# Patient Record
Sex: FEMALE | Race: WHITE | Employment: FULL TIME | URBAN - METROPOLITAN AREA
[De-identification: names, ages, dates, MRNs, and addresses within clinical notes are randomized per-mention and may not be internally consistent; named-entity substitution may affect disease eponyms.]

---

## 2019-08-19 ENCOUNTER — OFFICE VISIT (OUTPATIENT)
Dept: OBGYN CLINIC | Age: 54
End: 2019-08-19

## 2019-08-19 VITALS — BODY MASS INDEX: 37.38 KG/M2 | SYSTOLIC BLOOD PRESSURE: 163 MMHG | DIASTOLIC BLOOD PRESSURE: 89 MMHG | WEIGHT: 211 LBS

## 2019-08-19 DIAGNOSIS — Z01.419 WELL FEMALE EXAM WITH ROUTINE GYNECOLOGICAL EXAM: Primary | ICD-10-CM

## 2019-08-19 DIAGNOSIS — Z11.51 SPECIAL SCREENING EXAMINATION FOR HUMAN PAPILLOMAVIRUS (HPV): ICD-10-CM

## 2019-08-19 DIAGNOSIS — Z01.419 WELL WOMAN EXAM WITH ROUTINE GYNECOLOGICAL EXAM: ICD-10-CM

## 2019-08-19 RX ORDER — SIMVASTATIN 20 MG/1
TABLET, FILM COATED ORAL
COMMUNITY
Start: 2019-05-29

## 2019-08-19 RX ORDER — LISINOPRIL 10 MG/1
TABLET ORAL
COMMUNITY
Start: 2019-08-15

## 2019-08-19 NOTE — PROGRESS NOTES
Annual exam ages 40-58      Lance Lynne is a ,  49 y.o. female   Patient's last menstrual period was 2016 (exact date). She presents for her annual checkup. She is having hot flashes. With regard to the Gardasil vaccine, she is older than the FDA approved age to receive it. Menstrual status:    Her periods are nonexistent in flow. Contraception:    The current method of family planning is NA post menopause. Hormonal status:  She reports no perimenstrual type symptoms. She is not having vasomotor symptoms. The patient is not using any ERT. Sexual history:    She  reports that she does not engage in sexual activity. Medical conditions:    Since her last annual GYN exam about three years ago, she has not the following changes in her health history: none. Surgical history confirmed with patient. has a past surgical history that includes hx cholecystectomy; hx orthopaedic; endometrial cryoablation; hx tubal ligation; hx urological (13); and hx lap gastric bypass (10/2014). Pap and Mammogram History:    Her most recent Pap smear was normal, obtained 5 year(s) ago. The patient had her mammogram today in our office. Breast Cancer History/Substance Abuse: negative      Osteoporosis History:    Family history does not include a first or second degree relative with osteopenia or osteoporosis. A bone density scan has not been done.     Past Medical History:   Diagnosis Date    Edema of both legs     Hypercholesterolemia     Hypertension     Neuropathy     lower extremity-taking Neurontin    Obesity     Sleep apnea     Unspecified sleep apnea     compliant with c-pap    Vitamin D deficiency      Past Surgical History:   Procedure Laterality Date    ENDOMETRIAL CRYOABLATION      HX CHOLECYSTECTOMY      HX LAP GASTRIC BYPASS  10/2014    Sleeve    HX ORTHOPAEDIC      right foot surgery    HX TUBAL LIGATION      HX UROLOGICAL  13    TVT-O Current Outpatient Medications   Medication Sig Dispense Refill    lisinopril (PRINIVIL, ZESTRIL) 10 mg tablet       simvastatin (ZOCOR) 20 mg tablet       DEXTROAMPHETAMINE/AMPHETAMINE (ADDERALL PO) Take 5 mg by mouth two (2) times daily as needed.  CYANOCOBALAMIN, VITAMIN B-12, (LIQUID B 12 PO) Take  by mouth.  multivitamin (ONE A DAY) tablet Take 1 Tab by mouth daily.  calcium carbonate (MYLANTA) 400 mg chew Take 1 Tab by mouth three (3) times daily (with meals).  ergocalciferol (ERGOCALCIFEROL) 50,000 unit capsule        Allergies: Patient has no known allergies. Tobacco History:  reports that she has been smoking. She has been smoking about 1.00 pack per day. She has never used smokeless tobacco.  Alcohol Abuse:  reports that she drinks about 0.8 standard drinks of alcohol per week. Drug Abuse:  reports that she does not use drugs.     Family Medical/Cancer History:   Family History   Problem Relation Age of Onset    Heart Disease Mother     Hypertension Mother     Asthma Father     SLE Maternal Grandmother     Cancer Maternal Grandfather         unknown    Bipolar Disorder Brother         Review of Systems - History obtained from the patient  Constitutional: negative for weight loss, fever, night sweats  HEENT: negative for hearing loss, earache, congestion, snoring, sorethroat  CV: negative for chest pain, palpitations, edema  Resp: negative for cough, shortness of breath, wheezing  GI: negative for change in bowel habits, abdominal pain, black or bloody stools  : negative for frequency, dysuria, hematuria, vaginal discharge  MSK: negative for back pain, joint pain, muscle pain  Breast: negative for breast lumps, nipple discharge, galactorrhea  Skin :negative for itching, rash, hives  Neuro: negative for dizziness, headache, confusion, weakness  Psych: negative for anxiety, depression, change in mood  Heme/lymph: negative for bleeding, bruising, pallor    Physical Exam    Visit Vitals  /89   Wt 211 lb (95.7 kg)   LMP 02/18/2016 (Exact Date)   BMI 37.38 kg/m²       Constitutional  · Appearance: well-nourished, well developed, alert, in no acute distress    HENT  · Head and Face: appears normal    Neck  · Inspection/Palpation: normal appearance, no masses or tenderness  · Lymph Nodes: no lymphadenopathy present  · Thyroid: gland size normal, nontender, no nodules or masses present on palpation    Chest  · Respiratory Effort: breathing unlabored  · Auscultation:    Cardiovascular  · Heart:  · Auscultation:     Breasts  · Inspection of Breasts: breasts symmetrical, no skin changes, no discharge present, nipple appearance normal, no skin retraction present  · Palpation of Breasts and Axillae: no masses present on palpation, no breast tenderness  · Axillary Lymph Nodes: no lymphadenopathy present    Gastrointestinal  · Abdominal Examination: abdomen non-tender to palpation, normal bowel sounds, no masses present  · Liver and spleen: no hepatomegaly present, spleen not palpable  · Hernias: no hernias identified    Genitourinary  · External Genitalia: normal appearance for age, no discharge present, no tenderness present, no inflammatory lesions present, no masses present, no atrophy present  · Vagina: normal vaginal vault without central or paravaginal defects, no discharge present, no inflammatory lesions present, no masses present  · Bladder: non-tender to palpation  · Urethra: appears normal  · Cervix: normal   · Uterus: normal size, shape and consistency  · Adnexa: no adnexal tenderness present, no adnexal masses present  · Perineum: perineum within normal limits, no evidence of trauma, no rashes or skin lesions present  · Anus: anus within normal limits, no hemorrhoids present  · Inguinal Lymph Nodes: no lymphadenopathy present    Skin  · General Inspection: no rash, no lesions identified    Neurologic/Psychiatric  · Mental Status:  · Orientation: grossly oriented to person, place and time  · Mood and Affect: mood normal, affect appropriate    Assessment:  Routine gynecologic examination  Her current medical status is satisfactory with no evidence of significant gynecologic issues.     Plan:  Counseled re: diet, exercise, healthy lifestyle  Return for yearly wellness visits  Rec annual mammogram  Advised to f/u with PCP re BP

## 2019-08-22 LAB
CYTOLOGIST CVX/VAG CYTO: NORMAL
CYTOLOGY CVX/VAG DOC CYTO: NORMAL
CYTOLOGY CVX/VAG DOC THIN PREP: NORMAL
CYTOLOGY HISTORY:: NORMAL
DX ICD CODE: NORMAL
HPV I/H RISK 1 DNA CVX QL PROBE+SIG AMP: NEGATIVE
Lab: NORMAL
OTHER STN SPEC: NORMAL
STAT OF ADQ CVX/VAG CYTO-IMP: NORMAL

## 2021-07-20 ENCOUNTER — OFFICE VISIT (OUTPATIENT)
Dept: OBGYN CLINIC | Age: 56
End: 2021-07-20
Payer: COMMERCIAL

## 2021-07-20 VITALS
HEIGHT: 63 IN | SYSTOLIC BLOOD PRESSURE: 152 MMHG | DIASTOLIC BLOOD PRESSURE: 96 MMHG | WEIGHT: 233 LBS | BODY MASS INDEX: 41.29 KG/M2

## 2021-07-20 DIAGNOSIS — Z01.419 WELL WOMAN EXAM WITH ROUTINE GYNECOLOGICAL EXAM: Primary | ICD-10-CM

## 2021-07-20 PROCEDURE — 99396 PREV VISIT EST AGE 40-64: CPT | Performed by: OBSTETRICS & GYNECOLOGY

## 2021-07-20 RX ORDER — DEXTROAMPHETAMINE SACCHARATE, AMPHETAMINE ASPARTATE, DEXTROAMPHETAMINE SULFATE AND AMPHETAMINE SULFATE 2.5; 2.5; 2.5; 2.5 MG/1; MG/1; MG/1; MG/1
TABLET ORAL
COMMUNITY
Start: 2021-06-16 | End: 2021-07-20 | Stop reason: SDUPTHER

## 2021-07-20 NOTE — PROGRESS NOTES
Evelyn Duque is a ,  55 y.o. female WHITE/NON- whose LMP was on  who presents for her annual checkup. She is having no significant problems. Menstrual status:    She is not having periods because she is menopausal.    The patient is not using HRT. Contraception:    She does not need contraception because she is menopausal.    Sexual history:    She  reports previously being sexually active. She reports using the following method of birth control/protection: Surgical.    Medical conditions:    Since her last annual GYN exam about two years ago, she has had the following changes in her health history: none. Pap and Mammogram History:    Her most recent Pap smear was normal obtained 2 year(s) ago. The patient had her mammogram today in our office. Breast Cancer History/Substance Abuse:    She has no family history of breast cancer. Osteoporosis History:    Family history does not include a first or second degree relative with osteopenia or osteoporosis. A bone density scan has not been previously obtained. Past Medical History:   Diagnosis Date    Edema of both legs     Hypercholesterolemia     Hypertension     Neuropathy     lower extremity-taking Neurontin    Obesity     Sleep apnea     Unspecified sleep apnea     compliant with c-pap    Vitamin D deficiency      Past Surgical History:   Procedure Laterality Date    ENDOMETRIAL CRYOABLATION      HX CHOLECYSTECTOMY      HX LAP GASTRIC BYPASS  10/2014    Sleeve    HX ORTHOPAEDIC      right foot surgery    HX TUBAL LIGATION      HX UROLOGICAL  13    TVT-O     Current Outpatient Medications   Medication Sig Dispense Refill    lisinopril (PRINIVIL, ZESTRIL) 10 mg tablet       simvastatin (ZOCOR) 20 mg tablet       DEXTROAMPHETAMINE/AMPHETAMINE (ADDERALL PO) Take 5 mg by mouth two (2) times daily as needed. Allergies: Patient has no known allergies.    Social History     Socioeconomic History    Marital status:      Spouse name: Not on file    Number of children: Not on file    Years of education: Not on file    Highest education level: Not on file   Occupational History    Not on file   Tobacco Use    Smoking status: Current Every Day Smoker     Packs/day: 1.00    Smokeless tobacco: Never Used   Substance and Sexual Activity    Alcohol use: Yes     Alcohol/week: 0.8 standard drinks     Types: 1 Shots of liquor per week     Comment: occasionally    Drug use: No    Sexual activity: Not Currently     Birth control/protection: Surgical     Comment: tubal ligation   Other Topics Concern    Not on file   Social History Narrative    Not on file     Social Determinants of Health     Financial Resource Strain:     Difficulty of Paying Living Expenses:    Food Insecurity:     Worried About Running Out of Food in the Last Year:     Ran Out of Food in the Last Year:    Transportation Needs:     Lack of Transportation (Medical):  Lack of Transportation (Non-Medical):    Physical Activity:     Days of Exercise per Week:     Minutes of Exercise per Session:    Stress:     Feeling of Stress :    Social Connections:     Frequency of Communication with Friends and Family:     Frequency of Social Gatherings with Friends and Family:     Attends Zoroastrian Services:     Active Member of Clubs or Organizations:     Attends Club or Organization Meetings:     Marital Status:    Intimate Partner Violence:     Fear of Current or Ex-Partner:     Emotionally Abused:     Physically Abused:     Sexually Abused: Tobacco History:  reports that she has been smoking. She has been smoking about 1.00 pack per day. She has never used smokeless tobacco.  Alcohol Abuse:  reports current alcohol use of about 0.8 standard drinks of alcohol per week. Drug Abuse:  reports no history of drug use.   Patient Active Problem List   Diagnosis Code    HTN (hypertension) I10         Review of Systems - History obtained from the patient  Constitutional: negative for weight loss, fever, night sweats  HEENT: negative for hearing loss, earache, congestion, snoring, sorethroat  CV: negative for chest pain, palpitations, edema  Resp: negative for cough, shortness of breath, wheezing  GI: negative for change in bowel habits, abdominal pain, black or bloody stools  : negative for frequency, dysuria, hematuria, vaginal discharge  MSK: negative for back pain, joint pain, muscle pain  Breast: negative for breast lumps, nipple discharge, galactorrhea  Skin :negative for itching, rash, hives  Neuro: negative for dizziness, headache, confusion, weakness  Psych: negative for anxiety, depression, change in mood  Heme/lymph: negative for bleeding, bruising, pallor    Physical Exam    Visit Vitals  BP (!) 152/96   Ht 5' 3\" (1.6 m)   Wt 233 lb (105.7 kg)   LMP 02/18/2016 (Exact Date)   BMI 41.27 kg/m²     Constitutional  · Appearance: well-nourished, well developed, alert, in no acute distress    HENT  · Head and Face: appears normal    Neck  · Inspection/Palpation: normal appearance, no masses or tenderness  · Lymph Nodes: no lymphadenopathy present    Chest  · Respiratory Effort: breathing normal    Breasts  · Inspection of Breasts: breasts symmetrical, no skin changes, no discharge present, nipple appearance normal, no skin retraction present  · Palpation of Breasts and Axillae: no masses present on palpation, no breast tenderness  · Axillary Lymph Nodes: no lymphadenopathy present    Gastrointestinal  · Abdominal Examination: abdomen non-tender to palpation, normal bowel sounds, no masses present  · Liver and spleen: no hepatomegaly present, spleen not palpable  · Hernias: no hernias identified    Skin  · General Inspection: no rash, no lesions identified    Neurologic/Psychiatric  · Mental Status:  · Orientation: grossly oriented to person, place and time  · Mood and Affect: mood normal, affect appropriate    Genitourinary  · External Genitalia: normal appearance for age, no discharge present, no tenderness present, no inflammatory lesions present, no masses present, no atrophy present  · Vagina: normal vaginal vault without central or paravaginal defects, no discharge present, no inflammatory lesions present, no masses present  · Bladder: non-tender to palpation  · Urethra: appears normal  · Cervix: normal   · Uterus: normal size, shape and consistency  · Adnexa: no adnexal tenderness present, no adnexal masses present  · Perineum: perineum within normal limits, no evidence of trauma, no rashes or skin lesions present  · Anus: anus within normal limits, no hemorrhoids present  · Inguinal Lymph Nodes: no lymphadenopathy present    Assessment:  Routine gynecologic examination  Her current medical status is satisfactory with no evidence of significant gynecologic issues.     Plan:  Counseled re: diet, exercise, healthy lifestyle  Return for yearly wellness visits  Rec annual mammogram  Patient Verbalized understanding

## 2022-08-25 NOTE — PROGRESS NOTES
Surendra Rodriguez is a ,  65 y.o. female [address] [facility] whose LMP was on  who presents for her annual checkup. She is having no significant problems. Menstrual status:    She is not having periods because she is menopausal.    The patient is not using HRT. Contraception:    She does not need contraception because she is menopausal.    Sexual history:    She  reports that she is not currently sexually active. She reports using the following method of birth control/protection: Surgical.    Medical conditions:    Since her last annual GYN exam about one year ago, she has had the following changes in her health history: none. Pap and Mammogram History:    Her most recent Pap smear was normal obtained 3 year(s) ago on 19. The patient had a recent mammogram 22 which was negative for malignancy. Breast Cancer History/Substance Abuse:    She has a family history of breast cancer. Osteoporosis History:    Family history does not include a first or second degree relative with osteopenia or osteoporosis. A bone density scan has not been previously obtained.      Past Medical History:   Diagnosis Date    Edema of both legs     Hypercholesterolemia     Hypertension     Neuropathy     lower extremity-taking Neurontin    Obesity     Sleep apnea     Unspecified sleep apnea     compliant with c-pap    Vitamin D deficiency      Past Surgical History:   Procedure Laterality Date    ENDOMETRIAL CRYOABLATION      HX CHOLECYSTECTOMY      HX LAP GASTRIC BYPASS  10/2014    Sleeve    HX ORTHOPAEDIC      right foot surgery    HX TUBAL LIGATION      HX UROLOGICAL  13    TVT-O     Current Outpatient Medications   Medication Sig Dispense Refill    hydroCHLOROthiazide (HYDRODIURIL) 12.5 mg tablet 1 tablet in the morning      lisinopril (PRINIVIL, ZESTRIL) 10 mg tablet       simvastatin (ZOCOR) 20 mg tablet       DEXTROAMPHETAMINE/AMPHETAMINE (ADDERALL PO) Take 5 mg by mouth two (2) times daily as needed. Allergies: Patient has no known allergies. Social History     Socioeconomic History    Marital status:      Spouse name: Not on file    Number of children: Not on file    Years of education: Not on file    Highest education level: Not on file   Occupational History    Not on file   Tobacco Use    Smoking status: Every Day     Packs/day: 1.00     Types: Cigarettes    Smokeless tobacco: Never   Substance and Sexual Activity    Alcohol use: Yes     Alcohol/week: 0.8 standard drinks     Types: 1 Shots of liquor per week     Comment: occasionally    Drug use: No    Sexual activity: Not Currently     Birth control/protection: Surgical     Comment: tubal ligation   Other Topics Concern    Not on file   Social History Narrative    Not on file     Social Determinants of Health     Financial Resource Strain: Not on file   Food Insecurity: Not on file   Transportation Needs: Not on file   Physical Activity: Not on file   Stress: Not on file   Social Connections: Not on file   Intimate Partner Violence: Not on file   Housing Stability: Not on file     Tobacco History:  reports that she has been smoking cigarettes. She has been smoking an average of 1 pack per day. She has never used smokeless tobacco.  Alcohol Abuse:  reports current alcohol use of about 0.8 standard drinks per week. Drug Abuse:  reports no history of drug use.   Patient Active Problem List   Diagnosis Code    HTN (hypertension) I10         Review of Systems - History obtained from the patient  Constitutional: negative for weight loss, fever, night sweats  HEENT: negative for hearing loss, earache, congestion, snoring, sorethroat  CV: negative for chest pain, palpitations, edema  Resp: negative for cough, shortness of breath, wheezing  GI: negative for change in bowel habits, abdominal pain, black or bloody stools  : negative for frequency, dysuria, hematuria, vaginal discharge  MSK: negative for back pain, joint pain, muscle pain  Breast: negative for breast lumps, nipple discharge, galactorrhea  Skin :negative for itching, rash, hives  Neuro: negative for dizziness, headache, confusion, weakness  Psych: negative for anxiety, depression, change in mood  Heme/lymph: negative for bleeding, bruising, pallor    Physical Exam    Visit Vitals  /84   Wt 219 lb (99.3 kg)   LMP 02/18/2016 (Exact Date)   BMI 38.79 kg/m²     Constitutional  Appearance: well-nourished, well developed, alert, in no acute distress    HENT  Head and Face: appears normal    Neck  Inspection/Palpation: normal appearance, no masses or tenderness  Lymph Nodes: no lymphadenopathy present    Chest  Respiratory Effort: breathing normal    Breasts  Inspection of Breasts: breasts symmetrical, no skin changes, no discharge present, nipple appearance normal, no skin retraction present  Palpation of Breasts and Axillae: no masses present on palpation, no breast tenderness  Axillary Lymph Nodes: no lymphadenopathy present    Gastrointestinal  Abdominal Examination: abdomen non-tender to palpation, normal bowel sounds, no masses present  Liver and spleen: no hepatomegaly present, spleen not palpable  Hernias: no hernias identified    Skin  General Inspection: no rash, no lesions identified    Neurologic/Psychiatric  Mental Status:  Orientation: grossly oriented to person, place and time  Mood and Affect: mood normal, affect appropriate    Genitourinary  External Genitalia: normal appearance for age, no discharge present, no tenderness present, no inflammatory lesions present, no masses present, no atrophy present  Vagina: normal vaginal vault without central or paravaginal defects, no discharge present, no inflammatory lesions present, no masses present  Bladder: non-tender to palpation  Urethra: appears normal  Cervix: normal   Uterus: normal size, shape and consistency  Adnexa: no adnexal tenderness present, no adnexal masses present  Perineum: perineum within normal limits, no evidence of trauma, no rashes or skin lesions present  Anus: anus within normal limits, no hemorrhoids present  Inguinal Lymph Nodes: no lymphadenopathy present    Assessment:  Routine gynecologic examination  Her current medical status is satisfactory with no evidence of significant gynecologic issues.     Plan:  Counseled re: diet, exercise, healthy lifestyle  Return for yearly wellness visits  Rec annual mammogram  Patient Verbalized understanding

## 2022-08-29 ENCOUNTER — OFFICE VISIT (OUTPATIENT)
Dept: OBGYN CLINIC | Age: 57
End: 2022-08-29
Payer: COMMERCIAL

## 2022-08-29 VITALS — WEIGHT: 219 LBS | DIASTOLIC BLOOD PRESSURE: 84 MMHG | SYSTOLIC BLOOD PRESSURE: 137 MMHG | BODY MASS INDEX: 38.79 KG/M2

## 2022-08-29 DIAGNOSIS — Z01.419 WELL WOMAN EXAM WITH ROUTINE GYNECOLOGICAL EXAM: Primary | ICD-10-CM

## 2022-08-29 DIAGNOSIS — Z01.419 WELL WOMAN EXAM WITH ROUTINE GYNECOLOGICAL EXAM: ICD-10-CM

## 2022-08-29 PROCEDURE — 99396 PREV VISIT EST AGE 40-64: CPT | Performed by: OBSTETRICS & GYNECOLOGY

## 2022-08-29 RX ORDER — HYDROCHLOROTHIAZIDE 12.5 MG/1
TABLET ORAL
COMMUNITY
Start: 2022-04-26

## 2022-08-31 LAB
CYTOLOGIST CVX/VAG CYTO: NORMAL
CYTOLOGY CVX/VAG DOC CYTO: NORMAL
CYTOLOGY CVX/VAG DOC THIN PREP: NORMAL
CYTOLOGY HISTORY:: NORMAL
DX ICD CODE: NORMAL
HPV I/H RISK 4 DNA CVX QL PROBE+SIG AMP: NEGATIVE
Lab: NORMAL
OTHER STN SPEC: NORMAL
STAT OF ADQ CVX/VAG CYTO-IMP: NORMAL

## 2023-10-02 ENCOUNTER — TRANSCRIBE ORDERS (OUTPATIENT)
Facility: HOSPITAL | Age: 58
End: 2023-10-02

## 2023-10-02 DIAGNOSIS — Z12.31 VISIT FOR SCREENING MAMMOGRAM: Primary | ICD-10-CM

## 2023-11-20 ENCOUNTER — HOSPITAL ENCOUNTER (OUTPATIENT)
Facility: HOSPITAL | Age: 58
Discharge: HOME | End: 2023-11-23
Attending: STUDENT IN AN ORGANIZED HEALTH CARE EDUCATION/TRAINING PROGRAM
Payer: COMMERCIAL

## 2023-11-20 ENCOUNTER — OFFICE VISIT (OUTPATIENT)
Age: 58
End: 2023-11-20
Payer: COMMERCIAL

## 2023-11-20 VITALS — HEIGHT: 63 IN | BODY MASS INDEX: 38.8 KG/M2 | WEIGHT: 219 LBS

## 2023-11-20 VITALS — BODY MASS INDEX: 38.09 KG/M2 | DIASTOLIC BLOOD PRESSURE: 80 MMHG | SYSTOLIC BLOOD PRESSURE: 124 MMHG | WEIGHT: 215 LBS

## 2023-11-20 DIAGNOSIS — Z12.31 VISIT FOR SCREENING MAMMOGRAM: ICD-10-CM

## 2023-11-20 DIAGNOSIS — Z01.419 ENCOUNTER FOR GYNECOLOGICAL EXAMINATION WITHOUT ABNORMAL FINDING: Primary | ICD-10-CM

## 2023-11-20 PROCEDURE — 3079F DIAST BP 80-89 MM HG: CPT | Performed by: STUDENT IN AN ORGANIZED HEALTH CARE EDUCATION/TRAINING PROGRAM

## 2023-11-20 PROCEDURE — 3074F SYST BP LT 130 MM HG: CPT | Performed by: STUDENT IN AN ORGANIZED HEALTH CARE EDUCATION/TRAINING PROGRAM

## 2023-11-20 PROCEDURE — 99396 PREV VISIT EST AGE 40-64: CPT | Performed by: STUDENT IN AN ORGANIZED HEALTH CARE EDUCATION/TRAINING PROGRAM

## 2023-11-20 PROCEDURE — 77063 BREAST TOMOSYNTHESIS BI: CPT

## 2023-11-20 RX ORDER — DEXTROAMPHETAMINE SACCHARATE, AMPHETAMINE ASPARTATE, DEXTROAMPHETAMINE SULFATE AND AMPHETAMINE SULFATE 2.5; 2.5; 2.5; 2.5 MG/1; MG/1; MG/1; MG/1
TABLET ORAL
COMMUNITY
Start: 2019-09-12

## 2023-11-20 RX ORDER — VALSARTAN 160 MG/1
TABLET ORAL
COMMUNITY
Start: 2023-10-24

## 2023-11-20 RX ORDER — LEVOTHYROXINE SODIUM 0.05 MG/1
TABLET ORAL
COMMUNITY
Start: 2023-10-14

## 2023-11-20 RX ORDER — ALBUTEROL SULFATE 90 UG/1
AEROSOL, METERED RESPIRATORY (INHALATION)
COMMUNITY
Start: 2023-02-18

## 2023-11-20 RX ORDER — SEMAGLUTIDE 1.34 MG/ML
INJECTION, SOLUTION SUBCUTANEOUS
COMMUNITY
Start: 2023-11-06

## 2023-11-20 RX ORDER — ROSUVASTATIN CALCIUM 20 MG/1
20 TABLET, COATED ORAL
COMMUNITY
Start: 2023-10-14

## 2023-11-20 NOTE — PROGRESS NOTES
Sarah Fabian is a 63 y.o. female returns for an annual exam     Chief Complaint   Patient presents with    Annual Exam       No LMP recorded. Patient is postmenopausal.  Her periods are absent. Problems: no problems  Birth Control: tubal ligation and post menopausal status. Last Pap: normal obtained 1 year(s) ago on 8/29/22. She does not have a history of TORIN 2, 3 or cervical cancer. Last Mammogram:  had a mammogram today at [facility] .         Examination chaperoned by Erika Kirkland MA.
amphetamine-dextroamphetamine (ADDERALL) 10 MG tablet       levothyroxine (SYNTHROID) 50 MCG tablet TAKE 1 TABLET BY MOUTH EVERY DAY IN THE MORNING ON EMPTY STOMACH      rosuvastatin (CRESTOR) 20 MG tablet Take 1 tablet by mouth      OZEMPIC, 1 MG/DOSE, 4 MG/3ML SOPN       valsartan (DIOVAN) 160 MG tablet TAKE AS DIRECTED DAILY      hydroCHLOROthiazide (HYDRODIURIL) 12.5 MG tablet 1 tablet in the morning      lisinopril (PRINIVIL;ZESTRIL) 10 MG tablet ceived the following from [facility]: Outside name: lisinopril (PRINIVIL, ZESTRIL) 10 mg tablet      simvastatin (ZOCOR) 20 MG tablet ceived the following from [facility]: Outside name: simvastatin (ZOCOR) 20 mg tablet       No current facility-administered medications for this visit. Allergies: Patient has no known allergies. Tobacco History:  reports that she has been smoking cigarettes. She has been smoking an average of 1 pack per day. She has never used smokeless tobacco.  Alcohol Abuse:  reports current alcohol use of about 0.8 standard drinks of alcohol per week. Drug Abuse:  reports no history of drug use.     Family Medical/Cancer History:   Family History   Problem Relation Age of Onset    Hypertension Mother     Heart Disease Mother     Asthma Father     Breast Cancer Sister 48 - 56    Bipolar Disorder Brother     Lupus Maternal Grandmother     Cancer Maternal Grandfather         unknown        Review of Systems - History obtained from the patient  Constitutional: negative for weight loss, fever, night sweats  HEENT: negative for hearing loss, earache, congestion, snoring, sorethroat  CV: negative for chest pain, palpitations, edema  Resp: negative for cough, shortness of breath, wheezing  GI: negative for change in bowel habits, abdominal pain, black or bloody stools  : negative for frequency, dysuria, hematuria, vaginal discharge  MSK: negative for back pain, joint pain, muscle pain  Breast: negative for breast